# Patient Record
Sex: FEMALE | Race: WHITE | URBAN - METROPOLITAN AREA
[De-identification: names, ages, dates, MRNs, and addresses within clinical notes are randomized per-mention and may not be internally consistent; named-entity substitution may affect disease eponyms.]

---

## 2022-03-10 ENCOUNTER — OFFICE VISIT (OUTPATIENT)
Dept: URBAN - METROPOLITAN AREA CLINIC 51 | Facility: CLINIC | Age: 37
End: 2022-03-10
Payer: COMMERCIAL

## 2022-03-10 DIAGNOSIS — E11.9 TYPE 2 DIABETES MELLITUS WITHOUT COMPLICATIONS: Primary | ICD-10-CM

## 2022-03-10 DIAGNOSIS — R51.9 HEADACHE: ICD-10-CM

## 2022-03-10 DIAGNOSIS — Z79.84 LONG TERM (CURRENT) USE OF ORAL ANTIDIABETIC DRUGS: ICD-10-CM

## 2022-03-10 DIAGNOSIS — H52.13 MYOPIA, BILATERAL: ICD-10-CM

## 2022-03-10 DIAGNOSIS — Z83.511 FAMILY HISTORY OF GLAUCOMA: ICD-10-CM

## 2022-03-10 DIAGNOSIS — H43.813 VITREOUS DEGENERATION, BILATERAL: ICD-10-CM

## 2022-03-10 PROCEDURE — 92004 COMPRE OPH EXAM NEW PT 1/>: CPT | Performed by: OPTOMETRIST

## 2022-03-10 PROCEDURE — 92134 CPTRZ OPH DX IMG PST SGM RTA: CPT | Performed by: OPTOMETRIST

## 2022-03-10 PROCEDURE — 92133 CPTRZD OPH DX IMG PST SGM ON: CPT | Performed by: OPTOMETRIST

## 2022-03-10 PROCEDURE — 92250 FUNDUS PHOTOGRAPHY W/I&R: CPT | Performed by: OPTOMETRIST

## 2022-03-10 RX ORDER — PANTOPRAZOLE SODIUM 40 MG/1
40 MG TABLET, DELAYED RELEASE ORAL
Qty: 0 | Refills: 0 | Status: ACTIVE
Start: 2022-03-10

## 2022-03-10 ASSESSMENT — KERATOMETRY
OD: 42.59
OS: 42.70

## 2022-03-10 ASSESSMENT — INTRAOCULAR PRESSURE
OS: 20
OD: 20

## 2022-03-10 ASSESSMENT — VISUAL ACUITY
OD: 20/20
OS: 20/20

## 2022-03-10 NOTE — IMPRESSION/PLAN
Impression: Headache: R51.9. Plan: No ocular etiology OU. Patient should purse consult with PCP for neurology.

## 2022-03-10 NOTE — IMPRESSION/PLAN
Impression: Type 2 diabetes mellitus without complications: T12.8. *LA1c: 5.5 x Jan 2022, unknown LBG
*PCP Dr. Franklin Mo: No Background Diabetic Retinopathy, no Diabetic Macular Edema and no Neovascularization of the iris, disc, or elsewhere. Disc. ocular and systemic benefits of blood sugar control. The [employer] recommends target glycohemoglobin at 7.0% or less to minimize incidence of retinopathy as well as other systemic complications of diabetes mellitus. Send notes to PCP. Check annually.

## 2022-03-10 NOTE — IMPRESSION/PLAN
Impression: Family history of glaucoma: Z83.511. *FHx: father and paternal grandmother *GAT: 20 OD, 20 OS *RNFL OCT 3/10/22: BDL thinning sup OD, BDL thinning anu OS Plan: Discussed condition and reviewed RNFL OCT imaging. No glaucomatous defects at this time. Continue to monitor with annual RNFL OCT.